# Patient Record
Sex: FEMALE | Race: WHITE | Employment: UNEMPLOYED | URBAN - METROPOLITAN AREA
[De-identification: names, ages, dates, MRNs, and addresses within clinical notes are randomized per-mention and may not be internally consistent; named-entity substitution may affect disease eponyms.]

---

## 2018-11-05 ENCOUNTER — HOSPITAL ENCOUNTER (OUTPATIENT)
Dept: ULTRASOUND IMAGING | Age: 53
Discharge: HOME | End: 2018-11-05
Attending: FAMILY MEDICINE
Payer: COMMERCIAL

## 2018-11-05 DIAGNOSIS — R10.11 ABDOMINAL PAIN, RIGHT UPPER QUADRANT: ICD-10-CM

## 2018-11-05 PROCEDURE — 76700 US EXAM ABDOM COMPLETE: CPT

## 2020-11-05 ENCOUNTER — TRANSCRIBE ORDER (OUTPATIENT)
Dept: SCHEDULING | Age: 55
End: 2020-11-05

## 2020-11-05 DIAGNOSIS — E78.00 PURE HYPERCHOLESTEROLEMIA: Primary | ICD-10-CM

## 2021-06-01 ENCOUNTER — APPOINTMENT (OUTPATIENT)
Dept: GENERAL RADIOLOGY | Age: 56
End: 2021-06-01
Attending: EMERGENCY MEDICINE
Payer: COMMERCIAL

## 2021-06-01 ENCOUNTER — HOSPITAL ENCOUNTER (EMERGENCY)
Age: 56
Discharge: HOME | End: 2021-06-02
Attending: EMERGENCY MEDICINE
Payer: COMMERCIAL

## 2021-06-01 VITALS
BODY MASS INDEX: 22.27 KG/M2 | TEMPERATURE: 96.2 F | HEART RATE: 68 BPM | OXYGEN SATURATION: 100 % | RESPIRATION RATE: 13 BRPM | SYSTOLIC BLOOD PRESSURE: 137 MMHG | WEIGHT: 138 LBS | DIASTOLIC BLOOD PRESSURE: 125 MMHG

## 2021-06-01 DIAGNOSIS — S42.402A ELBOW FRACTURE, LEFT, CLOSED, INITIAL ENCOUNTER: ICD-10-CM

## 2021-06-01 DIAGNOSIS — S53.105A DISLOCATION OF LEFT ELBOW, INITIAL ENCOUNTER: Primary | ICD-10-CM

## 2021-06-01 PROCEDURE — 75810000302 HC ER LEVEL 2 CLOSED TREATMNT FRACTURE/DISLOCATION

## 2021-06-01 PROCEDURE — 74011250636 HC RX REV CODE- 250/636: Performed by: EMERGENCY MEDICINE

## 2021-06-01 PROCEDURE — 99284 EMERGENCY DEPT VISIT MOD MDM: CPT

## 2021-06-01 PROCEDURE — 74011250637 HC RX REV CODE- 250/637: Performed by: EMERGENCY MEDICINE

## 2021-06-01 PROCEDURE — 73080 X-RAY EXAM OF ELBOW: CPT

## 2021-06-01 PROCEDURE — 73070 X-RAY EXAM OF ELBOW: CPT

## 2021-06-01 RX ORDER — IBUPROFEN 800 MG/1
800 TABLET ORAL
Qty: 30 TABLET | Refills: 0 | Status: SHIPPED | OUTPATIENT
Start: 2021-06-01 | End: 2021-09-25

## 2021-06-01 RX ORDER — HYDROCODONE BITARTRATE AND ACETAMINOPHEN 5; 325 MG/1; MG/1
1 TABLET ORAL
Status: COMPLETED | OUTPATIENT
Start: 2021-06-01 | End: 2021-06-01

## 2021-06-01 RX ORDER — PROPOFOL 10 MG/ML
100 INJECTION, EMULSION INTRAVENOUS
Status: COMPLETED | OUTPATIENT
Start: 2021-06-01 | End: 2021-06-01

## 2021-06-01 RX ORDER — HYDROCODONE BITARTRATE AND ACETAMINOPHEN 5; 325 MG/1; MG/1
1 TABLET ORAL
Qty: 10 TABLET | Refills: 0 | Status: SHIPPED | OUTPATIENT
Start: 2021-06-01 | End: 2021-06-04

## 2021-06-01 RX ADMIN — HYDROCODONE BITARTRATE AND ACETAMINOPHEN 1 TABLET: 5; 325 TABLET ORAL at 23:11

## 2021-06-01 RX ADMIN — PROPOFOL 50 MG: 10 INJECTION, EMULSION INTRAVENOUS at 22:32

## 2021-06-01 NOTE — Clinical Note
[address] 
[facility] 
[address] [address] [phone] Work/School Note Date: 6/1/2021 To Whom It May concern: 
 
Milo Burton was seen and treated today in the emergency room by the following provider(s): 
Attending Provider: Anita Guevara MD.   
 
Milo Burton is excused from work/school on 6/1/2021 through 6/4/2021. She is medically clear to return to work/school on 6/5/2021. Sincerely, Khushi Frost MD

## 2021-06-02 ENCOUNTER — TRANSCRIBE ORDER (OUTPATIENT)
Dept: SCHEDULING | Age: 56
End: 2021-06-02

## 2021-06-02 DIAGNOSIS — S52.042A CLOSED DISPLACED FRACTURE OF CORONOID PROCESS OF LEFT ULNA, INITIAL ENCOUNTER: ICD-10-CM

## 2021-06-02 DIAGNOSIS — S53.105A CLOSED DISLOCATION OF LEFT ELBOW, INITIAL ENCOUNTER: Primary | ICD-10-CM

## 2021-06-02 NOTE — ED PROVIDER NOTES
79-year-old female without any significant past medical history who presents to the ED for evaluation after she fell and landed on her left side, in attempt to break her fall she landed on the outstretched left upper extremity and injured her left elbow. She is complaining of severe pain, severity 10 out of 10, constant, worse with movement, no relieving factors. She denies headache, neck pain, back pain, chest pain shortness of breath, nausea, vomiting, abdominal pain, dizziness, extremity weakness or numbness, sick contact, skin rash and recent travel. She admits to alcohol consumption this evening. The patient is right-handed. History reviewed. No pertinent past medical history. Past Surgical History:   Procedure Laterality Date    HX ORTHOPAEDIC      bilateral knees, shoulder, wrists         History reviewed. No pertinent family history. Social History     Socioeconomic History    Marital status:      Spouse name: Not on file    Number of children: Not on file    Years of education: Not on file    Highest education level: Not on file   Occupational History    Not on file   Tobacco Use    Smoking status: Never Smoker    Smokeless tobacco: Never Used   Vaping Use    Vaping Use: Never used   Substance and Sexual Activity    Alcohol use: Yes     Alcohol/week: 3.0 standard drinks     Types: 3 Glasses of wine per week    Drug use: Never    Sexual activity: Not on file   Other Topics Concern    Not on file   Social History Narrative    Not on file     Social Determinants of Health     Financial Resource Strain:     Difficulty of Paying Living Expenses:    Food Insecurity:     Worried About Running Out of Food in the Last Year:     [address] in the Last Year:    Transportation Needs:     Lack of Transportation (Medical):      Lack of Transportation (Non-Medical):    Physical Activity:     Days of Exercise per Week:     Minutes of Exercise per Session:    Stress:     Feeling of Stress :    Social Connections:     Frequency of Communication with Friends and Family:     Frequency of Social Gatherings with Friends and Family:     Attends Sabianist Services:     Active Member of Clubs or Organizations:     Attends Club or Organization Meetings:     Marital Status:    Intimate Partner Violence:     Fear of Current or Ex-Partner:     Emotionally Abused:     Physically Abused:     Sexually Abused: ALLERGIES: Patient has no known allergies. Review of Systems   All other systems reviewed and are negative. Vitals:    06/01/21 2246 06/01/21 2247 06/01/21 2250 06/01/21 2251   BP:   (!) 137/125    Pulse: 67 73 70 68   Resp: 15 26 12 13   Temp:       SpO2: 100% 100% 94% 100%   Weight:                Physical Exam  Vitals and nursing note reviewed. Exam conducted with a chaperone present. CONSTITUTIONAL: Well-appearing; well-nourished; in mild distress  HEAD: Normocephalic; atraumatic  EYES: PERRL; EOM intact; conjunctiva and sclera are clear bilaterally. ENT: No rhinorrhea; normal pharynx with no tonsillar hypertrophy; mucous membranes pink/moist, no erythema, no exudate. NECK: Supple; non-tender; no cervical lymphadenopathy  CARD: Normal S1, S2; no murmurs, rubs, or gallops. Regular rate and rhythm. RESP: Normal respiratory effort; breath sounds clear and equal bilaterally; no wheezes, rhonchi, or rales. ABD: Normal bowel sounds; non-distended; non-tender; no palpable organomegaly, no masses, no bruits. Back Exam: Normal inspection; no vertebral point tenderness, no CVA tenderness. Normal range of motion. EXT: Left arm held in extension with decreased motion motion secondary to pain; tenderness at the left elbow with swelling and deformity; distal pulses are normal, no edema. SKIN: Warm; dry; no rash.   NEURO:Alert and oriented x 3, coherent, ANDRES-XII grossly intact, sensory and motor are non-focal.        MDM  Number of Diagnoses or Management Options  Dislocation of left elbow, initial encounter  Elbow fracture, left, closed, initial encounter  Diagnosis management comments: Assessment: Fall with left elbow injury rule out fracture dislocation. The patient is hemodynamically stable. Plan: X-ray of the left elbow/procedural sedation/education, reassurance, symptomatic treatment/splint and sling/analgesia/serial exam/ Monitor and Reevaluate. Amount and/or Complexity of Data Reviewed  Clinical lab tests: ordered and reviewed  Tests in the radiology section of CPT®: ordered and reviewed  Tests in the medicine section of CPT®: reviewed and ordered  Discussion of test results with the performing providers: yes  Decide to obtain previous medical records or to obtain history from someone other than the patient: yes  Obtain history from someone other than the patient: yes  Review and summarize past medical records: yes  Discuss the patient with other providers: yes  Independent visualization of images, tracings, or specimens: yes           Procedural Sedation    Date/Time: 6/1/2021 11:20 PM  Performed by: Florecita Morales MD  Authorized by: Florecita Morales MD     Consent:     Consent obtained:  Written    Consent given by:  Patient    Risks discussed:   Allergic reaction, prolonged hypoxia resulting in organ damage, prolonged sedation necessitating reversal, respiratory compromise necessitating ventilatory assistance and intubation, inadequate sedation, nausea and vomiting    Alternatives discussed:  Analgesia without sedation  Indications:     Procedure performed:  Dislocation reduction    Procedure necessitating sedation performed by:  Physician performing sedation    Intended level of sedation:  Moderate (conscious sedation)  Pre-sedation assessment:     NPO status caution: urgency dictates proceeding with non-ideal NPO status      ASA classification: class 1 - normal, healthy patient      Neck mobility: normal      Mouth opening:  3 or more finger widths    Mallampati score:  I - soft palate, uvula, fauces, pillars visible    Pre-sedation assessments completed and reviewed: airway patency, anesthesia/sedation history, cardiovascular function, hydration status, mental status, nausea/vomiting, pain level, respiratory function and temperature      History of difficult intubation: no      Pre-sedation assessment completed:  6/1/2021 11:25 PM  Immediate pre-procedure details:     Reassessment: Patient reassessed immediately prior to procedure      Reviewed: vital signs, relevant labs/tests and NPO status      Verified: bag valve mask available, emergency equipment available, intubation equipment available, IV patency confirmed, oxygen available and reversal medications available    Procedure details (see MAR for exact dosages):     Sedation start time:  6/1/2021 11:30 PM    Preoxygenation:  Nasal cannula    Sedation:  Propofol    Intra-procedure monitoring:  Blood pressure monitoring, cardiac monitor, continuous capnometry, continuous pulse oximetry, frequent LOC assessments and frequent vital sign checks    Intra-procedure events: none      Sedation end time:  6/1/2021 11:45 PM  Post-procedure details:     Post-sedation assessment completed:  6/1/2021 11:45 PM    Estimated blood loss (see I/O flowsheets): no      Complications:  None    Post-sedation assessments completed and reviewed: airway patency, cardiovascular function, hydration status, mental status, nausea/vomiting, pain level, respiratory function and temperature      Specimens recovered:  None    Patient is stable for discharge or admission: yes      Patient tolerance:   Tolerated well, no immediate complications  Reduction of Joint    Date/Time: 6/1/2021 11:35 PM  Performed by: Kerri Little MD  Authorized by: Kerri Little MD     Consent:     Consent obtained:  Written    Consent given by:  Patient    Risks discussed:  Nerve damage, pain and vascular damage    Alternatives discussed:  No treatment  Injury:     Injury location:  Elbow    Elbow injury location:  L elbow    Elbow fracture type: coronoid process fracture    Pre-procedure assessment:     Neurological function: normal      Distal perfusion: normal      Range of motion: normal    Sedation:     Sedation type: Moderate (conscious) sedation  Anesthesia (see MAR for exact dosages): Anesthesia method:  None  Procedure details:     Manipulation performed: yes      Skin traction used: no      Skeletal traction used: no      Pin inserted: no      Reduction successful: yes      X-ray confirmed reduction: yes      Immobilization:  Splint and sling    Splint type:  Sugar tong    Supplies used:  Ortho-Glass  Post-procedure details:     Neurological function: normal      Distal perfusion: normal      Range of motion: normal      Patient tolerance of procedure: Tolerated well, no immediate complications        XRAY INTERPRETATION (ED MD)  Xray of left elbow shows posterior dislocation of the left elbow. No fracture seen. Tushar Garland MD 10:15 PM      XRAY INTERPRETATION (ED MD)  Postreduction x-ray of the left elbow shows successful reduction of the left elbow dislocation however there is a fracture of the coronoid process of the proximal ulna with slight displacement. Jessica Powers MD 10:48 PM.    Sling and sugar tong splint splint applied by me and evaluated by Tushar Garland MD.  Neurovascular status intact post splint application. Desired position maintained. Progress Note:   Pt has been reexamined by Tushar Garland MD. Pt is feeling much better. Symptoms have improved. All available results have been reviewed with pt and any available family. Pt understands sx, dx, and tx in ED. Care plan has been outlined and questions have been answered. Pt is ready to go home. Will send home on close reduction of left elbow fracture dislocation instructions. Prescription of Norco and Motrin. Splint and sling care education. . Outpatient referral with orthopedics in 2 days for reevaluation and further treatment as needed. Written by Liberty Webster MD,6:03 AM    .   .

## 2021-06-02 NOTE — ED NOTES
Patient given copy of dc instructions and  script(s). Patient verbalized understanding of instructions and script (s). Patient alert and oriented and in no acute distress. Patient discharged home ambulatory with spouse.

## 2021-06-02 NOTE — ED TRIAGE NOTES
Pt reports she was accidentally knocked over by puppy, fell backwards. Obvious deformity to left elbow.

## 2021-06-02 NOTE — ED NOTES
Time out performed by MD and primary RN. Propofol administered by MD. Pt tolerating well. VSS. Capnography in place, ambu bag and code cart at bedside. Medic assisting with procedure. 2245- procedure complete. VSS.      2300- Pt speaking in complete sentences. Tolerated PO.  VSS

## 2021-06-03 ENCOUNTER — HOSPITAL ENCOUNTER (OUTPATIENT)
Dept: CT IMAGING | Age: 56
Discharge: HOME | End: 2021-06-03
Attending: ORTHOPAEDIC SURGERY
Payer: COMMERCIAL

## 2021-06-03 DIAGNOSIS — S53.105A CLOSED DISLOCATION OF LEFT ELBOW, INITIAL ENCOUNTER: ICD-10-CM

## 2021-06-03 DIAGNOSIS — S52.042A CLOSED DISPLACED FRACTURE OF CORONOID PROCESS OF LEFT ULNA, INITIAL ENCOUNTER: ICD-10-CM

## 2021-06-03 PROCEDURE — 73200 CT UPPER EXTREMITY W/O DYE: CPT

## 2021-09-25 ENCOUNTER — APPOINTMENT (OUTPATIENT)
Dept: GENERAL RADIOLOGY | Age: 56
End: 2021-09-25
Attending: STUDENT IN AN ORGANIZED HEALTH CARE EDUCATION/TRAINING PROGRAM
Payer: COMMERCIAL

## 2021-09-25 ENCOUNTER — HOSPITAL ENCOUNTER (EMERGENCY)
Age: 56
Discharge: HOME | End: 2021-09-26
Attending: STUDENT IN AN ORGANIZED HEALTH CARE EDUCATION/TRAINING PROGRAM
Payer: COMMERCIAL

## 2021-09-25 DIAGNOSIS — R19.7 DIARRHEA OF PRESUMED INFECTIOUS ORIGIN: ICD-10-CM

## 2021-09-25 DIAGNOSIS — M62.838 MUSCLE SPASM: Primary | ICD-10-CM

## 2021-09-25 LAB
BASOPHILS # BLD: 0 K/UL (ref 0–0.1)
BASOPHILS NFR BLD: 1 % (ref 0–1)
COMMENT, HOLDF: NORMAL
DIFFERENTIAL METHOD BLD: NORMAL
EOSINOPHIL # BLD: 0.1 K/UL (ref 0–0.4)
EOSINOPHIL NFR BLD: 2 % (ref 0–7)
ERYTHROCYTE [DISTWIDTH] IN BLOOD BY AUTOMATED COUNT: 12.4 % (ref 11.5–14.5)
HCT VFR BLD AUTO: 41.1 % (ref 35–47)
HGB BLD-MCNC: 13.7 G/DL (ref 11.5–16)
IMM GRANULOCYTES # BLD AUTO: 0 K/UL (ref 0–0.04)
IMM GRANULOCYTES NFR BLD AUTO: 0 % (ref 0–0.5)
LYMPHOCYTES # BLD: 2.9 K/UL (ref 0.8–3.5)
LYMPHOCYTES NFR BLD: 48 % (ref 12–49)
MCH RBC QN AUTO: 30.6 PG (ref 26–34)
MCHC RBC AUTO-ENTMCNC: 33.3 G/DL (ref 30–36.5)
MCV RBC AUTO: 91.9 FL (ref 80–99)
MONOCYTES # BLD: 0.5 K/UL (ref 0–1)
MONOCYTES NFR BLD: 8 % (ref 5–13)
NEUTS SEG # BLD: 2.5 K/UL (ref 1.8–8)
NEUTS SEG NFR BLD: 41 % (ref 32–75)
NRBC # BLD: 0 K/UL (ref 0–0.01)
NRBC BLD-RTO: 0 PER 100 WBC
PLATELET # BLD AUTO: 259 K/UL (ref 150–400)
PMV BLD AUTO: 9.4 FL (ref 8.9–12.9)
RBC # BLD AUTO: 4.47 M/UL (ref 3.8–5.2)
SAMPLES BEING HELD,HOLD: NORMAL
WBC # BLD AUTO: 6.1 K/UL (ref 3.6–11)

## 2021-09-25 PROCEDURE — 99284 EMERGENCY DEPT VISIT MOD MDM: CPT

## 2021-09-25 PROCEDURE — 85025 COMPLETE CBC W/AUTO DIFF WBC: CPT

## 2021-09-25 PROCEDURE — 73590 X-RAY EXAM OF LOWER LEG: CPT

## 2021-09-25 PROCEDURE — 96360 HYDRATION IV INFUSION INIT: CPT

## 2021-09-25 PROCEDURE — 80048 BASIC METABOLIC PNL TOTAL CA: CPT

## 2021-09-25 PROCEDURE — 74011250636 HC RX REV CODE- 250/636: Performed by: STUDENT IN AN ORGANIZED HEALTH CARE EDUCATION/TRAINING PROGRAM

## 2021-09-25 PROCEDURE — 83735 ASSAY OF MAGNESIUM: CPT

## 2021-09-25 RX ORDER — SODIUM CHLORIDE 9 MG/ML
1000 INJECTION, SOLUTION INTRAVENOUS ONCE
Status: COMPLETED | OUTPATIENT
Start: 2021-09-26 | End: 2021-09-26

## 2021-09-25 RX ADMIN — SODIUM CHLORIDE 1000 ML: 9 INJECTION, SOLUTION INTRAVENOUS at 23:41

## 2021-09-26 VITALS
OXYGEN SATURATION: 98 % | BODY MASS INDEX: 22.2 KG/M2 | HEART RATE: 70 BPM | TEMPERATURE: 98.1 F | RESPIRATION RATE: 16 BRPM | SYSTOLIC BLOOD PRESSURE: 127 MMHG | WEIGHT: 137.57 LBS | DIASTOLIC BLOOD PRESSURE: 82 MMHG

## 2021-09-26 LAB
ANION GAP SERPL CALC-SCNC: 9 MMOL/L (ref 5–15)
BUN SERPL-MCNC: 11 MG/DL (ref 6–20)
BUN/CREAT SERPL: 12 (ref 12–20)
CALCIUM SERPL-MCNC: 8.8 MG/DL (ref 8.5–10.1)
CHLORIDE SERPL-SCNC: 105 MMOL/L (ref 97–108)
CO2 SERPL-SCNC: 27 MMOL/L (ref 21–32)
CREAT SERPL-MCNC: 0.9 MG/DL (ref 0.55–1.02)
GLUCOSE SERPL-MCNC: 113 MG/DL (ref 65–100)
MAGNESIUM SERPL-MCNC: 2.3 MG/DL (ref 1.6–2.4)
POTASSIUM SERPL-SCNC: 4 MMOL/L (ref 3.5–5.1)
SODIUM SERPL-SCNC: 141 MMOL/L (ref 136–145)

## 2021-09-26 PROCEDURE — 74011250637 HC RX REV CODE- 250/637: Performed by: STUDENT IN AN ORGANIZED HEALTH CARE EDUCATION/TRAINING PROGRAM

## 2021-09-26 RX ORDER — METHOCARBAMOL 500 MG/1
500 TABLET, FILM COATED ORAL ONCE
Status: COMPLETED | OUTPATIENT
Start: 2021-09-26 | End: 2021-09-26

## 2021-09-26 RX ORDER — METHOCARBAMOL 500 MG/1
500 TABLET, FILM COATED ORAL 4 TIMES DAILY
Qty: 20 TABLET | Refills: 0 | Status: SHIPPED | OUTPATIENT
Start: 2021-09-26 | End: 2021-10-01

## 2021-09-26 RX ADMIN — METHOCARBAMOL TABLETS 500 MG: 500 TABLET, COATED ORAL at 00:30

## 2021-09-26 NOTE — ED TRIAGE NOTES
Patient arrives with c/o left leg pain since last night. Previous injury to that leg.  Pulses palpated and equal.

## 2021-09-26 NOTE — ED PROVIDER NOTES
54 y.o. female presenting to the ED [facility] with L leg cramping. She has hx of trauma to the left lower extremity including a fibular fx 5 years ago with screw fixation. She has had loss of sensation in the foot from that with extensive workup and no findings to explain the sensory loss. She also has cramping in the leg from time to time but usually she is able to tolerate it. Yesterday she had several bowel movements after eating a bad salad associated with nausea and abdominal cramping. She then had 2 non-bloody diarrhea episodes. She has been trying to replete electrolytes today with pickle juice and liquid IV however over the past 24 hours has had severe cramping in the L leg. It comes in waves and seems to mostly affect the anterolateral aspect of the leg/shin. Pain so severe today it caused chills. No swelling. Currently no pain. No hx of dvt/pe. No estrogen use. No recent immobility/travel/surgery. This feels similar to prior episodes of spasm but much more severe. History reviewed. No pertinent past medical history. Past Surgical History:   Procedure Laterality Date    HX ORTHOPAEDIC      bilateral knees, shoulder, wrists         History reviewed. No pertinent family history. Social History     Socioeconomic History    Marital status:      Spouse name: Not on file    Number of children: Not on file    Years of education: Not on file    Highest education level: Not on file   Occupational History    Not on file   Tobacco Use    Smoking status: Never Smoker    Smokeless tobacco: Never Used   Vaping Use    Vaping Use: Never used   Substance and Sexual Activity    Alcohol use:  Yes     Alcohol/week: 3.0 standard drinks     Types: 3 Glasses of wine per week    Drug use: Never    Sexual activity: Not on file   Other Topics Concern    Not on file   Social History Narrative    Not on file     Social Determinants of Health     Financial Resource Strain:     Difficulty of Paying Living Expenses:    Food Insecurity:     Worried About [address] [facility] in the Last Year:     [address] in the Last Year:    Transportation Needs:     Lack of Transportation (Medical):  Lack of Transportation (Non-Medical):    Physical Activity:     Days of Exercise per Week:     Minutes of Exercise per Session:    Stress:     Feeling of Stress :    Social Connections:     Frequency of Communication with Friends and Family:     Frequency of Social Gatherings with Friends and Family:     Attends Anabaptism Services:     Active Member of Clubs or Organizations:     Attends Club or Organization Meetings:     Marital Status:    Intimate Partner Violence:     Fear of Current or Ex-Partner:     Emotionally Abused:     Physically Abused:     Sexually Abused: ALLERGIES: Patient has no known allergies. Review of Systems   Constitutional: Positive for chills. Negative for fever. HENT: Negative for congestion and rhinorrhea. Eyes: Negative for redness and visual disturbance. Respiratory: Negative for cough and shortness of breath. Cardiovascular: Negative for chest pain and leg swelling. Gastrointestinal: Positive for diarrhea. Negative for nausea and vomiting. Genitourinary: Negative for dysuria, flank pain, frequency, hematuria and urgency. Musculoskeletal: Positive for myalgias. Negative for arthralgias, back pain and neck pain. Skin: Negative for rash and wound. Allergic/Immunologic: Negative for immunocompromised state. Neurological: Negative for dizziness and headaches. Vitals:    09/25/21 2317   BP: (!) 146/88   Pulse: 71   Resp: 16   Temp: 98.1 °F (36.7 °C)   SpO2: 97%   Weight: 62.4 kg (137 lb 9.1 oz)            Physical Exam  Vitals and nursing note reviewed. Constitutional:       General: She is not in acute distress. Appearance: She is well-developed. She is not diaphoretic. HENT:      Head: Normocephalic.       Mouth/Throat:      Pharynx: No oropharyngeal exudate. Eyes:      General:         Right eye: No discharge. Left eye: No discharge. Pupils: Pupils are equal, round, and reactive to light. Cardiovascular:      Rate and Rhythm: Normal rate and regular rhythm. Heart sounds: Normal heart sounds. No murmur heard. No friction rub. No gallop. Pulmonary:      Effort: Pulmonary effort is normal. No respiratory distress. Breath sounds: Normal breath sounds. No stridor. No wheezing or rales. Abdominal:      General: Bowel sounds are normal. There is no distension. Palpations: Abdomen is soft. Tenderness: There is no abdominal tenderness. There is no guarding or rebound. Musculoskeletal:         General: No deformity. Normal range of motion. Cervical back: Normal range of motion and neck supple. Comments: LLE: palpable dp and pt pulse. Sensation decreased to dorsum of foot which is chronic. Motor intact. Neg cata's. No calf tenderness. No swelling of the calf. Compartments are soft. Skin:     General: Skin is warm and dry. Capillary Refill: Capillary refill takes less than 2 seconds. Findings: No rash. Neurological:      Mental Status: She is alert and oriented to person, place, and time. Psychiatric:         Behavior: Behavior normal.          Labs Reviewed:   CBC normal  Lytes normal  Renal function ok      Imaging Reviewed:   XR L tibia shows appropriate hardware placement, no other acute process      Course:  No recurrence of cramping here--will give pt one Robaxin to help prevent spasm while sleeping tonight and so that she does not have to go to pharmacy after midnight        MDM:  Altshiraz Aurora Score=0 for DVT making this low risk      54 y.o. female presents today with intermittent L shin cramping since last night. This is in the setting of frequent bowel movement+ diarrhea (suspected food poisoning). Labs no c/w electrolyte abnormality or CHRIS.  Suspect her leg spasm (which has been an ongoing problem) was exacerbated by an element of dehydration. She was given IVF 1L NSS here. Doubt DVT given mark [facility]. Will dc home with return precautions    Clinical Impression:     ICD-10-CM ICD-9-CM    1. Muscle spasm  M62.838 728.85    2. Diarrhea of presumed infectious origin  R19.7 009. 3            Disposition: MAVIS Chacko, DO

## 2021-09-26 NOTE — ED NOTES
Patient given discharge papers and instructions by staff  RN. Patient verbalized understanding and stated not having questions or concerns regarding her care. Patient ambulatory out of ED with .

## 2021-12-21 ENCOUNTER — TRANSCRIBE ORDER (OUTPATIENT)
Dept: SCHEDULING | Age: 56
End: 2021-12-21

## 2021-12-21 DIAGNOSIS — E78.00 PURE HYPERCHOLESTEROLEMIA: Primary | ICD-10-CM

## 2022-01-18 ENCOUNTER — HOSPITAL ENCOUNTER (OUTPATIENT)
Dept: CT IMAGING | Age: 57
Discharge: HOME | End: 2022-01-18
Attending: FAMILY MEDICINE
Payer: SELF-PAY

## 2022-01-18 DIAGNOSIS — E78.00 PURE HYPERCHOLESTEROLEMIA: ICD-10-CM

## 2022-01-18 PROCEDURE — 75571 CT HRT W/O DYE W/CA TEST: CPT

## 2022-11-07 ENCOUNTER — OFFICE VISIT (OUTPATIENT)
Dept: ORTHOPEDIC SURGERY | Age: 57
End: 2022-11-07
Payer: COMMERCIAL

## 2022-11-07 VITALS — HEIGHT: 66 IN | WEIGHT: 138 LBS | BODY MASS INDEX: 22.18 KG/M2

## 2022-11-07 DIAGNOSIS — M25.512 PAIN IN LEFT ACROMIOCLAVICULAR JOINT: Primary | ICD-10-CM

## 2022-11-07 PROCEDURE — 20605 DRAIN/INJ JOINT/BURSA W/O US: CPT | Performed by: ORTHOPAEDIC SURGERY

## 2022-11-07 RX ORDER — METHYLPREDNISOLONE ACETATE 80 MG/ML
80 INJECTION, SUSPENSION INTRA-ARTICULAR; INTRALESIONAL; INTRAMUSCULAR; SOFT TISSUE ONCE
Status: COMPLETED | OUTPATIENT
Start: 2022-11-07 | End: 2022-11-07

## 2022-11-07 RX ORDER — BUPIVACAINE HYDROCHLORIDE 2.5 MG/ML
5 INJECTION, SOLUTION INFILTRATION; PERINEURAL ONCE
Status: COMPLETED | OUTPATIENT
Start: 2022-11-07 | End: 2022-11-07

## 2022-11-07 RX ADMIN — METHYLPREDNISOLONE ACETATE 40 MG: 80 INJECTION, SUSPENSION INTRA-ARTICULAR; INTRALESIONAL; INTRAMUSCULAR; SOFT TISSUE at 17:01

## 2022-11-07 RX ADMIN — BUPIVACAINE HYDROCHLORIDE 12.5 MG: 2.5 INJECTION, SOLUTION INFILTRATION; PERINEURAL at 17:01

## 2022-11-07 NOTE — PROGRESS NOTES
Antonia Soni (: 1965) is a 62 y.o. female, patient, here for evaluation of the following chief complaint(s):  Knee Pain (Left ) and Shoulder Pain (Left shoulder pain)       HPI:    Patient presents to the office today with a chief complaint of chronic shoulder pain. She has been worked up by another orthopedist in the local area. She has had multiple cortisone injections with no improvement. She has had about 2 years of on and off shoulder pain. She describes discomfort that seems to be located across the outer part of the shoulder. However, what seems to bother her the most is pain that is along the clavicle. She points along the clavicle and all the way out to the acromioclavicular joint as a source of pain. Patient has had a prior surgical history in regards to her shoulder many years ago. She was told she has multidirectional instability or at least some form of hyperlaxity as well. No Known Allergies    No current outpatient medications on file. No current facility-administered medications for this visit. No past medical history on file. Past Surgical History:   Procedure Laterality Date    HX ORTHOPAEDIC      bilateral knees, shoulder, wrists       No family history on file. Social History     Socioeconomic History    Marital status:      Spouse name: Not on file    Number of children: Not on file    Years of education: Not on file    Highest education level: Not on file   Occupational History    Not on file   Tobacco Use    Smoking status: Never    Smokeless tobacco: Never   Vaping Use    Vaping Use: Never used   Substance and Sexual Activity    Alcohol use:  Yes     Alcohol/week: 3.0 standard drinks     Types: 3 Glasses of wine per week    Drug use: Never    Sexual activity: Not on file   Other Topics Concern    Not on file   Social History Narrative    Not on file     Social Determinants of Health     Financial Resource Strain: Not on file   Food Insecurity: Not on file   Transportation Needs: Not on file   Physical Activity: Not on file   Stress: Not on file   Social Connections: Not on file   Intimate Partner Violence: Not on file   Housing Stability: Not on file       Review of Systems   Musculoskeletal:         Left shoulder pain     Vitals:  Ht 5' 6\" (1.676 m)   Wt 138 lb (62.6 kg)   BMI 22.27 kg/m²    Body mass index is 22.27 kg/m². Ortho Exam     Patient is alert and oriented x3. Patient is in no acute distress. Patient ambulates with a nonantalgic gait. Left shoulder: No shoulder girdle atrophy . There is no soft tissue swelling, ecchymosis, abrasions or lacerations. Active range of motion of the shoulder is full with forward flexion, lateral abduction and external rotation. Internal rotation is to the SI joint and is painful. Passive range of motion is full with a positive impingement sign and a painful Jackson sign. Rotator cuff strength is painful to evaluate and is at 4+/5 with forward flexion and lateral abduction. External rotational strength is maintained. There is no crepitation about the joint. Palpation of the [facility] joint does reproduce discomfort, and there is positive pain elicited with cross-body adduction. Strength of the extremity is 5/5 at biceps/triceps/wrist extension and is comparable to the contralateral side. DTR's are intact at +2/4 and are symmetrical.  NVI      Right shoulder:  No shoulder girdle atrophy . There is no soft tissue swelling, ecchymosis, abrasions or lacerations. Active range of motion is full with forward flexion, lateral abduction and external rotation. Internal rotation is to the upper lumbar level with a negative lift-off sign. Passive range of motion is full with a negative impingement sign and a negative Jackson sign. Rotator cuff strength with forward flexion, lateral abduction and external rotation is intact with 5/5 strength. There is no crepitation about the joint.   Palpation of the [facility] joint does not reproduce discomfort, and there is no pain elicited with cross-body adduction. Strength of the extremity is 5/5 at biceps/triceps/wrist extension. DRT's are intact at +2/4 and  symmetrical.  Cervical range of motion is full with no pain to palpation along the paraspinal musculature medial border of the scapula. Spurling's sign is negative. Patient presents the office with an old MRI as well as an x-ray. The x-ray does not reveal any evidence of osteoarthritis. MRI evaluation reveals no evidence of rotator cuff pathology. The images are not of high quality so I cannot make a comment about the labrum but on this MRI there is no significant signs of labral pathology. ASSESSMENT/PLAN:    Patient presents the office for second opinion regarding ongoing left shoulder pain. Her pain seems to be mostly located along the acromioclavicular joint. It sounds as if she has had cortisone injection. However I believe her cortisone has been delivered in her subacromial space. Therefore, I left it up to her, but I think it be reasonable and appropriate to consider acromioclavicular joint injection. The patient agrees with this. She is consented to this injection. Under a sterile prep, the left acromioclavicular joint was injected today with 1 cc of Depo-Medrol and 1 cc of bupivacaine. Patient tolerated the injection very well. I have asked her to ice and modify her activity over the next 72 hours as this most likely will be painful. If this is very successful then this will let me know we are dealing with an [facility] joint pain pattern as opposed to a subacromial or deeper pain pattern of the shoulder. She is to return to the office if she has no improvement.         Kirby Sousa MD

## 2022-11-07 NOTE — Clinical Note
11/7/2022    Patient: Bertha Mayfield   YOB: 1965   Date of Visit: 11/7/2022     Becky Collier MD  [address] [place] [id number]  Via Fax: [phone]     Anita Mariee  [address] [place] [id number]  Via     Dear MD Anita Mercedes,      Thank you for referring Ms. Bertha Mayfield to [facility] for evaluation. My notes for this consultation are attached. If you have questions, please do not hesitate to call me. I look forward to following your patient along with you.       Sincerely,    Kirby Sousa MD

## 2022-12-12 ENCOUNTER — OFFICE VISIT (OUTPATIENT)
Dept: ORTHOPEDIC SURGERY | Age: 57
End: 2022-12-12
Payer: COMMERCIAL

## 2022-12-12 DIAGNOSIS — M19.012 ARTHRITIS OF LEFT ACROMIOCLAVICULAR JOINT: Primary | ICD-10-CM

## 2022-12-12 PROCEDURE — 99214 OFFICE O/P EST MOD 30 MIN: CPT | Performed by: ORTHOPAEDIC SURGERY

## 2022-12-12 NOTE — PROGRESS NOTES
Jeffrey Acuña (: 1965) is a 62 y.o. female, patient, here for evaluation of the following chief complaint(s):  No chief complaint on file. HPI:    Patient presents to the office today with recurrent discomfort of left shoulder. This is a patient who I saw in the office over about 5 weeks ago. She had had a prior surgical procedure performed in the left shoulder. She had recurrent pain of which mostly was centered around the acromioclavicular joint. Since that time, she has had a cortisone injection. She states that about 50 to just a little bit more than 50% of her pain has been reduced. She is here today for further advice. She also is getting a little bit of the similar pain in her right shoulder. No Known Allergies    No current outpatient medications on file. No current facility-administered medications for this visit. History reviewed. No pertinent past medical history. Past Surgical History:   Procedure Laterality Date    HX ORTHOPAEDIC      bilateral knees, shoulder, wrists       History reviewed. No pertinent family history. Social History     Socioeconomic History    Marital status:      Spouse name: Not on file    Number of children: Not on file    Years of education: Not on file    Highest education level: Not on file   Occupational History    Not on file   Tobacco Use    Smoking status: Never    Smokeless tobacco: Never   Vaping Use    Vaping Use: Never used   Substance and Sexual Activity    Alcohol use:  Yes     Alcohol/week: 3.0 standard drinks     Types: 3 Glasses of wine per week    Drug use: Never    Sexual activity: Not on file   Other Topics Concern    Not on file   Social History Narrative    Not on file     Social Determinants of Health     Financial Resource Strain: Not on file   Food Insecurity: Not on file   Transportation Needs: Not on file   Physical Activity: Not on file   Stress: Not on file   Social Connections: Not on file   Intimate Partner Violence: Not on file   Housing Stability: Not on file       Review of Systems    Vitals: There were no vitals taken for this visit. There is no height or weight on file to calculate BMI. Ortho Exam     Patient is alert and oriented x3. Patient is in no acute distress. Patient ambulates with a nonantalgic gait. Left shoulder: No shoulder girdle atrophy . There is no soft tissue swelling, ecchymosis, abrasions or lacerations. Active range of motion of the shoulder is full with forward flexion, lateral abduction and external rotation. Internal rotation is to the SI joint and is painful. Passive range of motion is full with a positive impingement sign and a painful Jackson sign. Rotator cuff strength is painful to evaluate and is at 4+/5 with forward flexion and lateral abduction. External rotational strength is maintained. There is no crepitation about the joint. Palpation of the [facility] joint does reproduce discomfort, and there is positive pain elicited with cross-body adduction. Strength of the extremity is 5/5 at biceps/triceps/wrist extension and is comparable to the contralateral side. DTR's are intact at +2/4 and are symmetrical.  NVI       Right shoulder:  No shoulder girdle atrophy . There is no soft tissue swelling, ecchymosis, abrasions or lacerations. Active range of motion is full with forward flexion, lateral abduction and external rotation. Internal rotation is to the upper lumbar level with a negative lift-off sign. Passive range of motion is full with a negative impingement sign and a negative Jackson sign. Rotator cuff strength with forward flexion, lateral abduction and external rotation is intact with 5/5 strength. There is no crepitation about the joint. Palpation of the [facility] joint does not reproduce discomfort, and there is no pain elicited with cross-body adduction. Strength of the extremity is 5/5 at biceps/triceps/wrist extension.   DRT's are intact at +2/4 and symmetrical.  Cervical range of motion is full with no pain to palpation along the paraspinal musculature medial border of the scapula. Spurling's sign is negative. Patient presents the office with an old MRI as well as an x-ray. The x-ray does not reveal any evidence of osteoarthritis. MRI was reviewed again and  reveals no evidence of rotator cuff pathology. The images are not of high quality so I cannot make a comment about the labrum but on this MRI there is no significant signs of labral pathology. ASSESSMENT/PLAN:    Patient returns to the office with recurrent discomfort of the left shoulder. I do long conversation with the patient. She had some level of improvement with the cortisone injection in her left acromioclavicular joint. Moving forward, it is reasonable to continue with conservative measures. If this becomes persistent and chronic, it is also reasonable to consider surgical intervention. Arthroscopy debridement and distal clavicle excision would be reasonable. I also explained to the patient, rotator cuff pathology could be identified diagnostically with arthroscopy. This would create a little bit longer recovery if this were to be noted. At this stage, we will continue to follow this conservatively. I have given her handout in regards to outpatient surgery. We discussed the surgical risks and benefits. She is to call. The risks and benefits were described to the patient. Patient understands there is a risk of infection, postoperative pain, numbness, tingling, stiffness MI, DVT, PE, and other unforeseen events. The patient also understands there is a long rehabilitative process that typically follows the surgical procedure. We talked about the possibility of not being able to alleviate all of the discomfort. Also, I explained  there is no guarantee all the function and strength will return.   The patient also understands the risks of re-tear or failure to heal.  The patient understands implants may be utilized during this surgery. The patient also understands the generalized, associated risk of anesthetic and wishes to proceed in an elective fashion.         Chriss Hammans, MD

## 2022-12-12 NOTE — LETTER
12/12/2022    Patient: Bertha Mayfield   YOB: 1965   Date of Visit: 12/12/2022     Becky Collier MD  [address] [place] [id number]  Via Fax: [phone]    Dear Becky Collier MD,      Thank you for referring Ms. Bertha Mayfield to [facility] for evaluation. My notes for this consultation are attached. If you have questions, please do not hesitate to call me. I look forward to following your patient along with you.       Sincerely,    Kirby Sousa MD

## 2023-01-13 DIAGNOSIS — M19.012 ARTHRITIS OF LEFT ACROMIOCLAVICULAR JOINT: Primary | ICD-10-CM

## 2023-01-30 ENCOUNTER — OFFICE VISIT (OUTPATIENT)
Dept: ORTHOPEDIC SURGERY | Age: 58
End: 2023-01-30

## 2023-01-30 DIAGNOSIS — M25.561 ACUTE PAIN OF RIGHT KNEE: Primary | ICD-10-CM

## 2023-01-30 DIAGNOSIS — R20.2 PARESTHESIA OF LOWER EXTREMITY: ICD-10-CM

## 2023-01-30 PROCEDURE — 99214 OFFICE O/P EST MOD 30 MIN: CPT | Performed by: ORTHOPAEDIC SURGERY

## 2023-01-30 NOTE — LETTER
1/30/2023    Patient: Eryn Van   YOB: 1965   Date of Visit: 1/30/2023     Carmencita Yoder MD  [address] [address]  Via Fax: [phone]    Dear Carmencita Yoder MD,      Thank you for referring Ms. Eryn Van to [facility] for evaluation. My notes for this consultation are attached. If you have questions, please do not hesitate to call me. I look forward to following your patient along with you.       Sincerely,    Jennifer Lagos MD

## 2023-01-30 NOTE — PROGRESS NOTES
Bienvenido Alexander (: 1965) is a 62 y.o. female, patient, here for evaluation of the following chief complaint(s):  Knee Pain (Right knee pain )       HPI:    Patient presents the office today with a chief complaint of right knee pain. Patient describes discomfort that is mostly located along the anterior aspect of the knee. She actually causes almost like a burning sensation across the front portion of the knee. She states it does not seem to be deep. She has not noted any swelling. She denies catching popping or locking of the knee joint. No Known Allergies    No current outpatient medications on file. No current facility-administered medications for this visit. No past medical history on file. Past Surgical History:   Procedure Laterality Date    HX ORTHOPAEDIC      bilateral knees, shoulder, wrists       No family history on file. Social History     Socioeconomic History    Marital status:      Spouse name: Not on file    Number of children: Not on file    Years of education: Not on file    Highest education level: Not on file   Occupational History    Not on file   Tobacco Use    Smoking status: Never    Smokeless tobacco: Never   Vaping Use    Vaping Use: Never used   Substance and Sexual Activity    Alcohol use: Yes     Alcohol/week: 3.0 standard drinks     Types: 3 Glasses of wine per week    Drug use: Never    Sexual activity: Not on file   Other Topics Concern    Not on file   Social History Narrative    Not on file     Social Determinants of Health     Financial Resource Strain: Not on file   Food Insecurity: Not on file   Transportation Needs: Not on file   Physical Activity: Not on file   Stress: Not on file   Social Connections: Not on file   Intimate Partner Violence: Not on file   Housing Stability: Not on file       Review of Systems   Musculoskeletal:         Right knee pain      Vitals: There were no vitals taken for this visit.    There is no height or weight on file to calculate BMI. Ortho Exam     Patient is alert and oriented x3. Patient is in no acute distress. Patient ambulates with a nonantalgic gait. Right knee: Mild tenderness is noted along the anterolateral aspect of the knee particularly with flexion. She has a mildly positive Tinel sign palpation over the site and it actually radiates in a retrofashion towards the medial aspect of the knee. There is no abrasions, lacerations, ecchymosis or soft tissue swelling. No effusion is identified. There is no pain to palpation along the medial or lateral border of the patella. There is no pain or crepitation with manipulation of the patella. There is normal excursion of the patella. Patellar grind test is negative. Active and passive range of motion is full and does not cause pain or crepitation. There is no pain with palpation along the medial femoral epicondyle or medial tibia and no pain with palpation over the lateral femoral epicondyle. There is no medial or lateral joint line tenderness. Levar's maneuver is negative. There is no collateral ligament instability. Anterior drawer, Lachman and posterior drawer are negative. There is no soft tissue swelling distally into the leg. Neurocirculatory examination is intact. Left knee: no abrasions, lacerations, ecchymosis or soft tissue swelling. No effusion is identified. There is no pain to palpation along the medial or lateral border of the patella. There is no pain or crepitation with manipulation of the patella. There is normal excursion of the patella. Patellar grind test is negative. Active and passive range of motion is full and does not cause pain or crepitation. There is no pain with palpation along the medial femoral epicondyle or medial tibia and no pain with palpation over the lateral femoral epicondyle. There is no medial or lateral joint line tenderness. Levar's maneuver is negative.   There is no collateral ligament instability. Anterior drawer, Lachman and posterior drawer are negative. There is no soft tissue swelling distally into the leg. XR Results (most recent):  Results from Appointment encounter on 01/30/23    XR KNEE RT MIN 4 V    Narrative  4 view x-ray of the right knee reveals no evidence of fracture dislocation. No signs of bony irregularity. Mild bilateral patella subluxation                 ASSESSMENT/PLAN:    She presents to the office today with what appears to be an isolated area of infrapatellar saphenous nerve neuropraxia. At this stage, it is 10 days into the process. I have asked her to keep a close eye on it. It most likely will resolve. I have asked her to avoid kneeling on this location. We talked about topical anti-inflammatories that may be helpful. Voltaren cream very well could be helpful for this situation.   If it worsens, she is to return to the office and further diagnostic testing may be required for        Sanchez Workman MD

## 2023-02-13 DIAGNOSIS — Z98.890 HISTORY OF ARTHROSCOPY OF LEFT SHOULDER: Primary | ICD-10-CM

## 2023-02-13 RX ORDER — OXYCODONE AND ACETAMINOPHEN 5; 325 MG/1; MG/1
1 TABLET ORAL
Qty: 40 TABLET | Refills: 0 | Status: SHIPPED | OUTPATIENT
Start: 2023-02-13 | End: 2023-02-20

## 2023-02-22 ENCOUNTER — OFFICE VISIT (OUTPATIENT)
Dept: ORTHOPEDIC SURGERY | Age: 58
End: 2023-02-22
Payer: COMMERCIAL

## 2023-02-22 DIAGNOSIS — Z98.890 HISTORY OF ARTHROSCOPY OF LEFT SHOULDER: Primary | ICD-10-CM

## 2023-02-22 PROCEDURE — 99024 POSTOP FOLLOW-UP VISIT: CPT | Performed by: ORTHOPAEDIC SURGERY

## 2023-02-22 NOTE — LETTER
2/22/2023    Patient: Leon Beach   YOB: 1965   Date of Visit: 2/22/2023     Tatum Godinez MD  [address] [address]  Via Fax: [phone]    Dear Tatum Godinez MD,      Thank you for referring Ms. Leon Beach to [facility] for evaluation. My notes for this consultation are attached. If you have questions, please do not hesitate to call me. I look forward to following your patient along with you.       Sincerely,    Francisco Simmons MD

## 2023-02-22 NOTE — PROGRESS NOTES
Rianna Carroll (: 1965) is a 62 y.o. female, patient, here for evaluation of the following chief complaint(s):  Shoulder Pain (Left shoulder )       HPI:    Patient returns to the office now status post surgery of her left shoulder. She states she is doing well. Pain is controlled. She denies numbness or tingling. She is here today alone. She has been using her sling. No Known Allergies    No current outpatient medications on file. No current facility-administered medications for this visit. No past medical history on file. Past Surgical History:   Procedure Laterality Date    HX ORTHOPAEDIC      bilateral knees, shoulder, wrists       No family history on file. Social History     Socioeconomic History    Marital status:      Spouse name: Not on file    Number of children: Not on file    Years of education: Not on file    Highest education level: Not on file   Occupational History    Not on file   Tobacco Use    Smoking status: Never    Smokeless tobacco: Never   Vaping Use    Vaping Use: Never used   Substance and Sexual Activity    Alcohol use: Yes     Alcohol/week: 3.0 standard drinks     Types: 3 Glasses of wine per week    Drug use: Never    Sexual activity: Not on file   Other Topics Concern    Not on file   Social History Narrative    Not on file     Social Determinants of Health     Financial Resource Strain: Not on file   Food Insecurity: Not on file   Transportation Needs: Not on file   Physical Activity: Not on file   Stress: Not on file   Social Connections: Not on file   Intimate Partner Violence: Not on file   Housing Stability: Not on file       Review of Systems   Musculoskeletal:         Left shoulder post op     Vitals: There were no vitals taken for this visit. There is no height or weight on file to calculate BMI. Ortho Exam     Left shoulder: Portal sites are healing well. Minimal ecchymosis no swelling.   She has 155 degrees of forward elevation, 140 degrees lateral duction and 65 degrees of external rotation. She has minimal pain with manipulation of the shoulder. She has no swelling noted distally. Neurovascular examination is intact. ASSESSMENT/PLAN:    Patient is progressing well. I have gone over a physician directed home exercise program.  I would also recommend structured physical therapy. We have gone over management of her portal sites. She is to return to the office in 4 weeks.         Kenrick Rockwell MD

## 2023-03-02 ENCOUNTER — OFFICE VISIT (OUTPATIENT)
Dept: ORTHOPEDIC SURGERY | Age: 58
End: 2023-03-02

## 2023-03-02 DIAGNOSIS — M25.512 PAIN IN LEFT ACROMIOCLAVICULAR JOINT: ICD-10-CM

## 2023-03-02 DIAGNOSIS — M19.012 ARTHRITIS OF LEFT ACROMIOCLAVICULAR JOINT: Primary | ICD-10-CM

## 2023-03-02 NOTE — PROGRESS NOTES
Hill Walters (: 1965) is a 62 y.o. Shoulder Pain       Patient Name: Hill Walters  HDRQ:  : 1965  [x]  Patient  Verified  Payor: [place] Counter / Plan: Leonid Parada / Product Type: Thresea Clifford /    Niesha Anthony MD  Total Treatment Time (min): 45  Total Timed Codes (min): 45  1:1 Treatment Time ( [address] only):  Visit #: 1  20      Treatment Area: Left shoulder    ASSESSMENT/PLAN:  Below is the assessment and plan developed based on review of pertinent history, physical exam, labs, studies, and medications. Patient comes in today 2 weeks status post left shoulder acromioplasty and presents with physical therapy deficits including range of motion, flexibility, mobility, strength, and biomechanics. She will benefit from a physical therapy program to address above-mentioned deficits. Short-term goals: To become independent with today's prescribed home exercise program in 1 week. Long-term goals: To progress with a physical therapy program so the patient demonstrates functional left shoulder range of motion and strength with improved posture and shoulder mechanics allowing her to transition back into activities such as basketball and regular exercise without shoulder pain in the next 4 to 8 weeks. Additionally, patient will score a clinically significant improvement of 30 percentage points on the shoulder pain and disability index in the next 4 to 8 weeks. Patient will be seen twice a week for up to 20 visits  with focus on progressive restoration of range of motion and strength, balance, and functional mobility. Therapeutic applications will include but are not limited to:Manual therapy, joint mobilization, myofascial release, therapeutic exercises. Modalities including ultrasound and electric stimulation heat and ice. Kinesiotape and Laguna taping for joint reeducation and approximation of tissue for neuromuscular reeducation. 1. Arthritis of left acromioclavicular joint  2. Pain in left acromioclavicular joint    Return in about 1 week (around 3/9/2023). SUBJECTIVE:  HPI  Patient reports improvement since surgery. She has been icing once a day. She is not taking pain medication. Patient's goals are to return to activities such as basketball, weight training, and yard work. She is right-hand dominant. Past medical history includes bilateral shoulder acromioplasty 16 years ago, left elbow fracture with ORIF in 2020 2 as well as prior bilateral knee meniscectomies. Please see patient's medical chart for detail list of current medications as well as significant past medical history. OBJECTIVE:  Evaluation (20 minutes)  Patient comes in today demonstrating a rounded shoulder posture. Cervical range of motion is pain-free and within functional limits. She is neurologically intact throughout bilateral upper extremities. Test negative for Spurling's test and for upper limb tension testing bilaterally. Left shoulder: Active shoulder abduction limited by 15 degrees to contralateral side. Scapular dyskinesia. Relative hypomobility to glenohumeral joint. Passive range of motion is within normal limits. One half grade deficit with resisted supraspinatus and infraspinatus testing. She does have a positive Jackson test.  Flexibility restriction to biceps, pectoralis minor, and latissimus. One half grade deficit with resisted speeds test.    Shoulder pain and disability index    Total pain score 50%    Total disability score 37%    Total score 42%    Manual(mins 10)  Glenohumeral mobilization with passive range of motion. Low load prolonged stretch with shoulder external rotation with posterior capsule stretch. Exercise(mins 15)  With today's interventions we initiated exercises for postural and scapular education, range of motion, strengthening, and flexibility for patient perform at home on a daily basis.   Today's exercises include supine mid trap, corner stretch, seated rows, seated LAT pull downs, bilateral resisted shoulder external rotation, and bicep wall stretch. An electronic signature was used to authenticate this note.   -- Esdras Vides, PT

## 2023-03-06 ENCOUNTER — OFFICE VISIT (OUTPATIENT)
Dept: ORTHOPEDIC SURGERY | Age: 58
End: 2023-03-06

## 2023-03-06 DIAGNOSIS — M19.012 ARTHRITIS OF LEFT ACROMIOCLAVICULAR JOINT: Primary | ICD-10-CM

## 2023-03-06 DIAGNOSIS — Z98.890 HISTORY OF ARTHROSCOPY OF LEFT SHOULDER: ICD-10-CM

## 2023-03-06 NOTE — PROGRESS NOTES
Joel Meeks (: 1965) is a 62 y.o. Shoulder Pain       Patient Name: Joel Meeks  Date:3/6/2023  : 1965  [x]  Patient  Verified  Payor: Margarte Larios / Plan: Savanna Michael / Product Type: Deshaun Salazar /    Jesus Pool MD  Total Treatment Time (min): 45  Total Timed Codes (min): 45  1:1 Treatment Time ( [address] only):  Visit #: 2 of 20      Treatment Area: Left shoulder    ASSESSMENT/PLAN:  Below is the assessment and plan developed based on review of pertinent history, physical exam, labs, studies, and medications. Progressing with exercise program prescribed at initial evaluation. Overall she is doing well with full active and passive range of motion. We will continue to work on  and progress towards return to recreational activities including weight lifting and playing basketball. 1. Arthritis of left acromioclavicular joint  2. History of arthroscopy of left shoulder    Return in about 2 days (around 3/8/2023) for Continue therapy for shoulder . SUBJECTIVE:  HPI    Stated that she still have some pain but it is manageable. Reports tightness comparatively to contralateral side. She would like to get back to doing her recreational activities. OBJECTIVE:    ROM:  Active- Full  Passive - Full     Manual(mins 10)  Glenohumeral mobilization with passive range of motion. Low load prolonged stretch with shoulder external rotation with posterior capsule stretch. Exercise(mins 15)  With today's interventions we initiated exercises for postural and scapular education, range of motion, strengthening, and flexibility for patient perform at home on a daily basis. Today's exercises include supine mid trap, corner stretch, seated rows, seated LAT pull downs, bilateral resisted shoulder external rotation, UBE, IR resisted, and bicep wall stretch. An electronic signature was used to authenticate this note.   --Co-treated by JOSE L Martin

## 2023-03-22 ENCOUNTER — OFFICE VISIT (OUTPATIENT)
Dept: ORTHOPEDIC SURGERY | Age: 58
End: 2023-03-22
Payer: COMMERCIAL

## 2023-03-22 DIAGNOSIS — M19.012 ARTHRITIS OF LEFT ACROMIOCLAVICULAR JOINT: Primary | ICD-10-CM

## 2023-03-22 DIAGNOSIS — Z98.890 HISTORY OF ARTHROSCOPY OF LEFT SHOULDER: ICD-10-CM

## 2023-03-22 PROCEDURE — 99024 POSTOP FOLLOW-UP VISIT: CPT | Performed by: ORTHOPAEDIC SURGERY

## 2023-03-22 NOTE — LETTER
3/22/2023    Patient: Kyra Madden   YOB: 1965   Date of Visit: 3/22/2023     Fredo Francois MD  [address] [place] [id number]  Via Fax: [phone]    Dear Fredo Francois MD,      Thank you for referring Ms. Kyra Madden to [facility] for evaluation. My notes for this consultation are attached. If you have questions, please do not hesitate to call me. I look forward to following your patient along with you.       Sincerely,    Wu Kelly MD

## 2023-03-22 NOTE — PROGRESS NOTES
Eual Schirmer (: 1965) is a 62 y.o. female, patient, here for evaluation of the following chief complaint(s):  Shoulder Pain (Left shoulder )       HPI:    Patient returns to clinic today for follow-up evaluation of her left shoulder. She is approximately 5 weeks status post left shoulder arthroscopy with distal clavicle excision. Patient has been doing well. She has not gone to physical therapy recently. She states she achieved all of her motion, she does have some residual discomfort to the top part of the shoulder and around the backside of the shoulder. She is wondering how much to push her rehabilitation efforts at this point. No Known Allergies    No current outpatient medications on file. No current facility-administered medications for this visit. No past medical history on file. Past Surgical History:   Procedure Laterality Date    HX ORTHOPAEDIC      bilateral knees, shoulder, wrists       No family history on file. Social History     Socioeconomic History    Marital status:      Spouse name: Not on file    Number of children: Not on file    Years of education: Not on file    Highest education level: Not on file   Occupational History    Not on file   Tobacco Use    Smoking status: Never    Smokeless tobacco: Never   Vaping Use    Vaping Use: Never used   Substance and Sexual Activity    Alcohol use:  Yes     Alcohol/week: 3.0 standard drinks     Types: 3 Glasses of wine per week    Drug use: Never    Sexual activity: Not on file   Other Topics Concern    Not on file   Social History Narrative    Not on file     Social Determinants of Health     Financial Resource Strain: Not on file   Food Insecurity: Not on file   Transportation Needs: Not on file   Physical Activity: Not on file   Stress: Not on file   Social Connections: Not on file   Intimate Partner Violence: Not on file   Housing Stability: Not on file       Review of Systems   Musculoskeletal:         Left shoulder      Vitals: There were no vitals taken for this visit. There is no height or weight on file to calculate BMI. Ortho Exam     Patient is alert and orient x3. She is in no acute distress. She walks with a nonantalgic gait    Left shoulder: No shoulder girdle atrophy. Arthroscopic incisions are well-healed. there is no soft tissue swelling, ecchymosis, abrasions or lacerations. Active range of motion is full with forward flexion, lateral abduction and external rotation. Internal rotation is to the lumbar level with a negative lift-off sign. Passive range of motion is full with a negative impingement sign and a negative Jackson sign. Rotator cuff strength with forward flexion, lateral abduction and external rotation is intact with 5/5 strength. There is no crepitation about the joint. Palpation of the [facility] joint does not reproduce discomfort, but there is pain elicited with cross-body adduction. Strength of the extremity is 5/5 at biceps/triceps/wrist extension. DRT's are intact at +2/4 and  symmetrical.  Cervical range of motion is full with no pain to palpation along the paraspinal musculature medial border of the scapula. Spurling's sign is negative. ASSESSMENT/PLAN:    We discussed with the patient that her residual soreness to the top part of the shoulder is likely from the [facility] joint maturing its scar tissue. She was given reassurance that pushing forward in therapy with strengthening and building the endurance to the left shoulder is not going to harm anything. She admits she has been very protective of the left shoulder over the past couple years. She has inevitably develop some decompensation to the shoulder girdle musculature. We recommended to continue with self-guided efforts. She seems very educated and capable on moving forward with her self-guided rehab and does not need to continue on with formal therapy sessions.   We told the patient to monitor symptoms over the next 3 to 4 weeks. If at that time she is still symptomatic she will return to clinic for repeat evaluation.         Sharonda Chen MD

## 2023-07-07 ENCOUNTER — HOSPITAL ENCOUNTER (EMERGENCY)
Facility: HOSPITAL | Age: 58
Discharge: HOME | End: 2023-07-07
Attending: EMERGENCY MEDICINE
Payer: COMMERCIAL

## 2023-07-07 ENCOUNTER — APPOINTMENT (OUTPATIENT)
Facility: HOSPITAL | Age: 58
End: 2023-07-07
Payer: COMMERCIAL

## 2023-07-07 VITALS
HEART RATE: 90 BPM | SYSTOLIC BLOOD PRESSURE: 117 MMHG | OXYGEN SATURATION: 94 % | DIASTOLIC BLOOD PRESSURE: 71 MMHG | TEMPERATURE: 97.5 F | RESPIRATION RATE: 18 BRPM

## 2023-07-07 DIAGNOSIS — D72.829 LEUKOCYTOSIS, UNSPECIFIED TYPE: ICD-10-CM

## 2023-07-07 DIAGNOSIS — J18.9 PNEUMONIA DUE TO INFECTIOUS ORGANISM, UNSPECIFIED LATERALITY, UNSPECIFIED PART OF LUNG: Primary | ICD-10-CM

## 2023-07-07 LAB
ALBUMIN SERPL-MCNC: 4.3 G/DL (ref 3.5–5)
ALBUMIN/GLOB SERPL: 1.2 (ref 1.1–2.2)
ALP SERPL-CCNC: 54 U/L (ref 45–117)
ALT SERPL-CCNC: 18 U/L (ref 12–78)
ANION GAP SERPL CALC-SCNC: 2 MMOL/L (ref 5–15)
APPEARANCE UR: CLEAR
AST SERPL-CCNC: 14 U/L (ref 15–37)
BACTERIA URNS QL MICRO: NEGATIVE /HPF
BASOPHILS # BLD: 0 K/UL (ref 0–0.1)
BASOPHILS NFR BLD: 0 % (ref 0–1)
BILIRUB SERPL-MCNC: 1.2 MG/DL (ref 0.2–1)
BILIRUB UR QL: NEGATIVE
BUN SERPL-MCNC: 11 MG/DL (ref 6–20)
BUN/CREAT SERPL: 15 (ref 12–20)
CALCIUM SERPL-MCNC: 8.9 MG/DL (ref 8.5–10.1)
CHLORIDE SERPL-SCNC: 107 MMOL/L (ref 97–108)
CO2 SERPL-SCNC: 25 MMOL/L (ref 21–32)
COLOR UR: ABNORMAL
CREAT SERPL-MCNC: 0.72 MG/DL (ref 0.55–1.02)
DIFFERENTIAL METHOD BLD: ABNORMAL
EOSINOPHIL # BLD: 0.1 K/UL (ref 0–0.4)
EOSINOPHIL NFR BLD: 1 % (ref 0–7)
EPITH CASTS URNS QL MICRO: ABNORMAL /LPF
ERYTHROCYTE [DISTWIDTH] IN BLOOD BY AUTOMATED COUNT: 12.9 % (ref 11.5–14.5)
GLOBULIN SER CALC-MCNC: 3.5 G/DL (ref 2–4)
GLUCOSE SERPL-MCNC: 110 MG/DL (ref 65–100)
GLUCOSE UR STRIP.AUTO-MCNC: NEGATIVE MG/DL
HCT VFR BLD AUTO: 43.7 % (ref 35–47)
HGB BLD-MCNC: 13.9 G/DL (ref 11.5–16)
HGB UR QL STRIP: NEGATIVE
HYALINE CASTS URNS QL MICRO: ABNORMAL /LPF (ref 0–5)
IMM GRANULOCYTES # BLD AUTO: 0.1 K/UL (ref 0–0.04)
IMM GRANULOCYTES NFR BLD AUTO: 1 % (ref 0–0.5)
KETONES UR QL STRIP.AUTO: 80 MG/DL
LEUKOCYTE ESTERASE UR QL STRIP.AUTO: ABNORMAL
LYMPHOCYTES # BLD: 0.8 K/UL (ref 0.8–3.5)
LYMPHOCYTES NFR BLD: 6 % (ref 12–49)
MCH RBC QN AUTO: 29.4 PG (ref 26–34)
MCHC RBC AUTO-ENTMCNC: 31.8 G/DL (ref 30–36.5)
MCV RBC AUTO: 92.6 FL (ref 80–99)
MONOCYTES # BLD: 0.7 K/UL (ref 0–1)
MONOCYTES NFR BLD: 5 % (ref 5–13)
NEUTS SEG # BLD: 11.3 K/UL (ref 1.8–8)
NEUTS SEG NFR BLD: 87 % (ref 32–75)
NITRITE UR QL STRIP.AUTO: NEGATIVE
NRBC # BLD: 0 K/UL (ref 0–0.01)
NRBC BLD-RTO: 0 PER 100 WBC
PH UR STRIP: 5.5 (ref 5–8)
PLATELET # BLD AUTO: 239 K/UL (ref 150–400)
PMV BLD AUTO: 10.1 FL (ref 8.9–12.9)
POTASSIUM SERPL-SCNC: 3.9 MMOL/L (ref 3.5–5.1)
PROT SERPL-MCNC: 7.8 G/DL (ref 6.4–8.2)
PROT UR STRIP-MCNC: NEGATIVE MG/DL
RBC # BLD AUTO: 4.72 M/UL (ref 3.8–5.2)
RBC #/AREA URNS HPF: ABNORMAL /HPF (ref 0–5)
RBC MORPH BLD: ABNORMAL
SODIUM SERPL-SCNC: 134 MMOL/L (ref 136–145)
SP GR UR REFRACTOMETRY: 1.02 (ref 1–1.03)
UROBILINOGEN UR QL STRIP.AUTO: 0.2 EU/DL (ref 0.2–1)
WBC # BLD AUTO: 13 K/UL (ref 3.6–11)
WBC URNS QL MICRO: ABNORMAL /HPF (ref 0–4)

## 2023-07-07 PROCEDURE — A4216 STERILE WATER/SALINE, 10 ML: HCPCS | Performed by: NURSE PRACTITIONER

## 2023-07-07 PROCEDURE — 36415 COLL VENOUS BLD VENIPUNCTURE: CPT

## 2023-07-07 PROCEDURE — 80053 COMPREHEN METABOLIC PANEL: CPT

## 2023-07-07 PROCEDURE — 96374 THER/PROPH/DIAG INJ IV PUSH: CPT

## 2023-07-07 PROCEDURE — 96375 TX/PRO/DX INJ NEW DRUG ADDON: CPT

## 2023-07-07 PROCEDURE — 6360000004 HC RX CONTRAST MEDICATION: Performed by: RADIOLOGY

## 2023-07-07 PROCEDURE — 99285 EMERGENCY DEPT VISIT HI MDM: CPT

## 2023-07-07 PROCEDURE — 81001 URINALYSIS AUTO W/SCOPE: CPT

## 2023-07-07 PROCEDURE — 74177 CT ABD & PELVIS W/CONTRAST: CPT

## 2023-07-07 PROCEDURE — 6360000002 HC RX W HCPCS: Performed by: NURSE PRACTITIONER

## 2023-07-07 PROCEDURE — 2580000003 HC RX 258: Performed by: NURSE PRACTITIONER

## 2023-07-07 PROCEDURE — 85025 COMPLETE CBC W/AUTO DIFF WBC: CPT

## 2023-07-07 PROCEDURE — 87086 URINE CULTURE/COLONY COUNT: CPT

## 2023-07-07 RX ORDER — 0.9 % SODIUM CHLORIDE 0.9 %
1000 INTRAVENOUS SOLUTION INTRAVENOUS ONCE
Status: COMPLETED | OUTPATIENT
Start: 2023-07-07 | End: 2023-07-07

## 2023-07-07 RX ORDER — AZITHROMYCIN 250 MG/1
250 TABLET, FILM COATED ORAL SEE ADMIN INSTRUCTIONS
Qty: 6 TABLET | Refills: 0 | Status: SHIPPED | OUTPATIENT
Start: 2023-07-07 | End: 2023-07-12

## 2023-07-07 RX ORDER — ONDANSETRON 2 MG/ML
4 INJECTION INTRAMUSCULAR; INTRAVENOUS ONCE
Status: COMPLETED | OUTPATIENT
Start: 2023-07-07 | End: 2023-07-07

## 2023-07-07 RX ORDER — KETOROLAC TROMETHAMINE 30 MG/ML
15 INJECTION, SOLUTION INTRAMUSCULAR; INTRAVENOUS ONCE
Status: COMPLETED | OUTPATIENT
Start: 2023-07-07 | End: 2023-07-07

## 2023-07-07 RX ADMIN — CEFTRIAXONE SODIUM 1000 MG: 1 INJECTION, POWDER, FOR SOLUTION INTRAMUSCULAR; INTRAVENOUS at 12:51

## 2023-07-07 RX ADMIN — IOPAMIDOL 100 ML: 755 INJECTION, SOLUTION INTRAVENOUS at 12:31

## 2023-07-07 RX ADMIN — SODIUM CHLORIDE 1000 ML: 9 INJECTION, SOLUTION INTRAVENOUS at 11:06

## 2023-07-07 RX ADMIN — ONDANSETRON HYDROCHLORIDE 4 MG: 2 INJECTION, SOLUTION INTRAMUSCULAR; INTRAVENOUS at 11:10

## 2023-07-07 RX ADMIN — KETOROLAC TROMETHAMINE 15 MG: 30 INJECTION, SOLUTION INTRAMUSCULAR at 11:09

## 2023-07-07 ASSESSMENT — PAIN DESCRIPTION - DESCRIPTORS: DESCRIPTORS: ACHING

## 2023-07-07 ASSESSMENT — PAIN DESCRIPTION - ORIENTATION: ORIENTATION: RIGHT;LEFT

## 2023-07-07 ASSESSMENT — PAIN DESCRIPTION - LOCATION
LOCATION: BACK;HEAD
LOCATION: BACK

## 2023-07-07 ASSESSMENT — PAIN SCALES - GENERAL
PAINLEVEL_OUTOF10: 7
PAINLEVEL_OUTOF10: 8

## 2023-07-07 ASSESSMENT — PAIN DESCRIPTION - PAIN TYPE: TYPE: ACUTE PAIN

## 2023-07-07 ASSESSMENT — PAIN - FUNCTIONAL ASSESSMENT: PAIN_FUNCTIONAL_ASSESSMENT: 0-10

## 2023-07-07 NOTE — ED PROVIDER NOTES
[address] EMERGENCY [facility]      Pt Name: Georgia Vargas  MRN: [id number]  [address] [facility] 1965  Date of evaluation: 7/7/2023  Provider: MARK Garcia NP      HISTORY OF PRESENT ILLNESS      This is a 60-year-old female who presents ambulatory to the emergency room with complaints of urinary urgency, frequency and lower back pain. The history is provided by the patient. Nursing Notes were reviewed. REVIEW OF SYSTEMS         Review of Systems   Constitutional:  Positive for chills. Negative for appetite change, diaphoresis, fatigue and fever. HENT:  Negative for congestion, sinus pressure, sinus pain and trouble swallowing. Eyes:  Negative for pain and redness. Respiratory:  Negative for cough and shortness of breath. Cardiovascular:  Negative for chest pain and palpitations. Gastrointestinal:  Positive for nausea and vomiting. Negative for abdominal distention and abdominal pain. Genitourinary:  Positive for frequency and urgency. Negative for difficulty urinating. Musculoskeletal:  Negative for arthralgias, neck pain and neck stiffness. Skin:  Negative for color change, pallor, rash and wound. Neurological:  Negative for speech difficulty and headaches. Hematological:  Does not bruise/bleed easily. Psychiatric/Behavioral:  The patient is not nervous/anxious. PAST MEDICAL HISTORY   No past medical history on file. SURGICAL HISTORY       Past Surgical History:   Procedure Laterality Date    ORTHOPEDIC SURGERY      bilateral knees, shoulder, wrists         CURRENT MEDICATIONS       Previous Medications    No medications on file       ALLERGIES     Patient has no known allergies. FAMILY HISTORY     No family history on file.        SOCIAL HISTORY       Social History     Socioeconomic History    Marital status:    Tobacco Use    Smoking status: Never    Smokeless tobacco: Never   Substance and Sexual Activity    Alcohol

## 2023-07-07 NOTE — ED TRIAGE NOTES
Patient reports urinary frequency and lower back pain. She has completed treatment for uti and continues to have symptoms. +chill +nausea and vomiting.

## 2023-07-08 LAB
BACTERIA SPEC CULT: NORMAL
CC UR VC: NORMAL
SERVICE CMNT-IMP: NORMAL

## 2023-07-09 ASSESSMENT — ENCOUNTER SYMPTOMS
ABDOMINAL PAIN: 0
NAUSEA: 1
EYE PAIN: 0
COUGH: 0
VOMITING: 1
SHORTNESS OF BREATH: 0
COLOR CHANGE: 0
SINUS PAIN: 0
SINUS PRESSURE: 0
EYE REDNESS: 0
TROUBLE SWALLOWING: 0
ABDOMINAL DISTENTION: 0